# Patient Record
Sex: FEMALE | Race: BLACK OR AFRICAN AMERICAN | NOT HISPANIC OR LATINO
[De-identification: names, ages, dates, MRNs, and addresses within clinical notes are randomized per-mention and may not be internally consistent; named-entity substitution may affect disease eponyms.]

---

## 2020-06-29 PROBLEM — Z00.00 ENCOUNTER FOR PREVENTIVE HEALTH EXAMINATION: Status: ACTIVE | Noted: 2020-06-29

## 2020-07-08 ENCOUNTER — LABORATORY RESULT (OUTPATIENT)
Age: 75
End: 2020-07-08

## 2020-07-08 ENCOUNTER — APPOINTMENT (OUTPATIENT)
Dept: NEPHROLOGY | Facility: CLINIC | Age: 75
End: 2020-07-08
Payer: MEDICARE

## 2020-07-08 VITALS
SYSTOLIC BLOOD PRESSURE: 144 MMHG | BODY MASS INDEX: 29.62 KG/M2 | WEIGHT: 200 LBS | DIASTOLIC BLOOD PRESSURE: 85 MMHG | HEART RATE: 72 BPM | HEIGHT: 69 IN

## 2020-07-08 VITALS — SYSTOLIC BLOOD PRESSURE: 132 MMHG | DIASTOLIC BLOOD PRESSURE: 80 MMHG

## 2020-07-08 PROCEDURE — 99204 OFFICE O/P NEW MOD 45 MIN: CPT

## 2020-07-08 RX ORDER — ATORVASTATIN CALCIUM 40 MG/1
40 TABLET, FILM COATED ORAL
Refills: 0 | Status: ACTIVE | COMMUNITY

## 2020-07-08 NOTE — HISTORY OF PRESENT ILLNESS
[FreeTextEntry1] : 75-year-old woman with a 15 to 20-year history of hypertension and type 2 diabetes, who also has CAD, anemia of CKD, gout, hyperlipidemia, asthma, and ill-defined swallowing difficulty.  She appears to have diabetic neuropathy affecting the hands, but not the feet, and no obvious retinopathy.  Her BP has not been well controlled by history, often running 1 50-1 70 systolic.  She is currently on losartan 50 mg daily, carvedilol 12.5 mg twice daily, and furosemide 40 mg, but not daily.  1 month ago, her creatinine was 1.99 with a BUN of 31 and a GFR of 28, hemoglobin 9.5, CO2 25 -but no potassium was reported because of hemolysis.  She is referred by her PCP, Dr. Fausto Mills.  She is a former smoker of 2 packs/day who quit 15 years ago.  She has been unable to ambulate and uses a walker for the last 7 years.  She does not take NSAIDs or PPIs.  She had COVID-19 2 to 3 months ago, tested positive, but recuperated at home.

## 2020-07-08 NOTE — CONSULT LETTER
[Dear  ___] : Dear  [unfilled], [Please see my note below.] : Please see my note below. [Consult Letter:] : I had the pleasure of evaluating your patient, [unfilled]. [Sincerely,] : Sincerely, [Consult Closing:] : Thank you very much for allowing me to participate in the care of this patient.  If you have any questions, please do not hesitate to contact me. [FreeTextEntry2] : Dr Fausto Mills [FreeTextEntry3] : Sincerely, \par \par Rajan Quigley MD, FACP

## 2020-07-08 NOTE — REVIEW OF SYSTEMS
[Feeling Tired] : feeling tired [Eyes Itch] : itching of the eyes [SOB on Exertion] : shortness of breath during exertion [Wheezing] : wheezing [Negative] : Endocrine [de-identified] : pruritus - face

## 2020-07-08 NOTE — PHYSICAL EXAM
[General Appearance - Alert] : alert [General Appearance - In No Acute Distress] : in no acute distress [Sclera] : the sclera and conjunctiva were normal [PERRL With Normal Accommodation] : pupils were equal in size, round, and reactive to light [Outer Ear] : the ears and nose were normal in appearance [Neck Cervical Mass (___cm)] : no neck mass was observed [Neck Appearance] : the appearance of the neck was normal [Jugular Venous Distention Increased] : there was no jugular-venous distention [Auscultation Breath Sounds / Voice Sounds] : lungs were clear to auscultation bilaterally [Heart Rate And Rhythm] : heart rate was normal and rhythm regular [Heart Sounds] : normal S1 and S2 [Heart Sounds Gallop] : no gallops [Murmurs] : no murmurs [Heart Sounds Pericardial Friction Rub] : no pericardial rub [Edema] : there was no peripheral edema [Bowel Sounds] : normal bowel sounds [Abdomen Soft] : soft [Abdomen Tenderness] : non-tender [Cervical Lymph Nodes Enlarged Posterior Bilaterally] : posterior cervical [Abdomen Mass (___ Cm)] : no abdominal mass palpated [Cervical Lymph Nodes Enlarged Anterior Bilaterally] : anterior cervical [Supraclavicular Lymph Nodes Enlarged Bilaterally] : supraclavicular [No CVA Tenderness] : no ~M costovertebral angle tenderness [No Spinal Tenderness] : no spinal tenderness [Nail Clubbing] : no clubbing  or cyanosis of the fingernails [Musculoskeletal - Swelling] : no joint swelling seen [Motor Tone] : muscle strength and tone were normal [Skin Color & Pigmentation] : normal skin color and pigmentation [Skin Turgor] : normal skin turgor [Deep Tendon Reflexes (DTR)] : deep tendon reflexes were 2+ and symmetric [] : no rash [No Focal Deficits] : no focal deficits

## 2020-07-08 NOTE — ASSESSMENT
[FreeTextEntry1] : This 75-year-old woman referred by Dr Mills, has CKD 4, probably due to a combination of diabetic nephropathy and hypertensive nephrosclerosis -but we will attempt to rule out any other causes.  Her BP today was surprisingly good but will need to be watched closely.. Her daughter states that her potassium has been high and we will repeat that since the June labs did not include a potassium due to hemolysis according to the lab.  She does not have overt uremic symptoms.  We will order renal ultrasound to assess size, echogenicity, and rule out obstructive uropathy.  We will repeat BMP, CBC, and obtain iron/TIBC, ferritin, JONATHAN, hepatitis B surface antigen, hepatitis C antibody, phosphorus, PTH, urine microalbumin, uric acid.  If all is well, we will see her again in 4 months and she will follow-up regularly with Dr. Mills.  We discussed, the patient, her daughter, and I, the need for good BP control and diabetes control in order to slow the progression of CKD.  She did not exhibit metabolic acidosis on the June labs, which is good, since that tends to accelerate the progression of CKD.  She was reminded to avoid all NSAIDs, and we should avoid PPIs whenever possible.

## 2020-07-09 LAB
25(OH)D3 SERPL-MCNC: 18.9 NG/ML
ANION GAP SERPL CALC-SCNC: 13 MMOL/L
BASOPHILS # BLD AUTO: 0.05 K/UL
BASOPHILS NFR BLD AUTO: 0.9 %
BUN SERPL-MCNC: 30 MG/DL
CALCIUM SERPL-MCNC: 9.9 MG/DL
CALCIUM SERPL-MCNC: 9.9 MG/DL
CHLORIDE SERPL-SCNC: 109 MMOL/L
CO2 SERPL-SCNC: 22 MMOL/L
CREAT SERPL-MCNC: 1.84 MG/DL
DEPRECATED KAPPA LC FREE/LAMBDA SER: 2.43 RATIO
EOSINOPHIL # BLD AUTO: 0.34 K/UL
EOSINOPHIL NFR BLD AUTO: 5.8 %
EPO SERPL-MCNC: 12.6 MIU/ML
ESTIMATED AVERAGE GLUCOSE: 128 MG/DL
FERRITIN SERPL-MCNC: 145 NG/ML
GLUCOSE SERPL-MCNC: 113 MG/DL
HBA1C MFR BLD HPLC: 6.1 %
HBV SURFACE AG SER QL: NONREACTIVE
HCT VFR BLD CALC: 35.7 %
HCV AB SER QL: NONREACTIVE
HCV S/CO RATIO: 0.33 S/CO
HGB BLD-MCNC: 10.5 G/DL
IMM GRANULOCYTES NFR BLD AUTO: 0.2 %
IRON SATN MFR SERPL: 23 %
IRON SERPL-MCNC: 55 UG/DL
KAPPA LC CSF-MCNC: 4.22 MG/DL
KAPPA LC SERPL-MCNC: 10.27 MG/DL
LYMPHOCYTES # BLD AUTO: 1.06 K/UL
LYMPHOCYTES NFR BLD AUTO: 18.2 %
MAN DIFF?: NORMAL
MCHC RBC-ENTMCNC: 29.4 GM/DL
MCHC RBC-ENTMCNC: 30.3 PG
MCV RBC AUTO: 102.9 FL
MONOCYTES # BLD AUTO: 0.38 K/UL
MONOCYTES NFR BLD AUTO: 6.5 %
NEUTROPHILS # BLD AUTO: 3.98 K/UL
NEUTROPHILS NFR BLD AUTO: 68.4 %
PARATHYROID HORMONE INTACT: 215 PG/ML
PHOSPHATE SERPL-MCNC: 3.7 MG/DL
PLATELET # BLD AUTO: 247 K/UL
POTASSIUM SERPL-SCNC: 5.4 MMOL/L
RBC # BLD: 3.47 M/UL
RBC # FLD: 14 %
SODIUM SERPL-SCNC: 143 MMOL/L
TIBC SERPL-MCNC: 241 UG/DL
UIBC SERPL-MCNC: 186 UG/DL
WBC # FLD AUTO: 5.82 K/UL

## 2020-07-14 ENCOUNTER — OUTPATIENT (OUTPATIENT)
Dept: OUTPATIENT SERVICES | Facility: HOSPITAL | Age: 75
LOS: 1 days | End: 2020-07-14
Payer: MEDICARE

## 2020-07-14 ENCOUNTER — APPOINTMENT (OUTPATIENT)
Dept: ULTRASOUND IMAGING | Facility: HOSPITAL | Age: 75
End: 2020-07-14
Payer: MEDICARE

## 2020-07-14 PROCEDURE — 76770 US EXAM ABDO BACK WALL COMP: CPT

## 2020-07-14 PROCEDURE — 76770 US EXAM ABDO BACK WALL COMP: CPT | Mod: 26

## 2020-07-20 LAB
ANA PAT FLD IF-IMP: ABNORMAL
ANA SER IF-ACNC: ABNORMAL

## 2020-10-05 ENCOUNTER — APPOINTMENT (OUTPATIENT)
Dept: HEMATOLOGY ONCOLOGY | Facility: CLINIC | Age: 75
End: 2020-10-05
Payer: MEDICARE

## 2020-10-05 VITALS
HEIGHT: 69 IN | WEIGHT: 205 LBS | DIASTOLIC BLOOD PRESSURE: 76 MMHG | OXYGEN SATURATION: 98 % | TEMPERATURE: 97.6 F | HEART RATE: 94 BPM | BODY MASS INDEX: 30.36 KG/M2 | SYSTOLIC BLOOD PRESSURE: 172 MMHG

## 2020-10-05 VITALS — DIASTOLIC BLOOD PRESSURE: 90 MMHG | SYSTOLIC BLOOD PRESSURE: 184 MMHG

## 2020-10-05 PROCEDURE — 36415 COLL VENOUS BLD VENIPUNCTURE: CPT

## 2020-10-05 PROCEDURE — 99204 OFFICE O/P NEW MOD 45 MIN: CPT | Mod: 25

## 2020-10-05 RX ORDER — ASPIRIN ENTERIC COATED TABLETS 81 MG 81 MG/1
81 TABLET, DELAYED RELEASE ORAL
Refills: 0 | Status: ACTIVE | COMMUNITY

## 2020-10-06 RX ORDER — CLOPIDOGREL BISULFATE 75 MG/1
75 TABLET, FILM COATED ORAL
Qty: 90 | Refills: 0 | Status: ACTIVE | COMMUNITY
Start: 2020-09-02

## 2020-10-27 NOTE — ASSESSMENT
[FreeTextEntry1] : Normochromic normocytic anemia in patient with CKD... Repeat immunoelectrophoresis rule out myeloma...\par \par Discussed with patient and her daughter vitamin D deficiency, she was given written instruction re vitamin D replacement...\par \par Patient was found to have an IgG of 1700, with a very high beta-2 microglobulin disproportionate to 2 degree of her anemia, I therefore recommend bone marrow aspirate and biopsy to be done by interventional radiology.

## 2020-10-27 NOTE — CONSULT LETTER
[Dear  ___] : Dear  [unfilled], [Consult Letter:] : I had the pleasure of evaluating your patient, [unfilled]. [Please see my note below.] : Please see my note below. [Consult Closing:] : Thank you very much for allowing me to participate in the care of this patient.  If you have any questions, please do not hesitate to contact me. [Sincerely,] : Sincerely, [FreeTextEntry3] : Sendy Boyer MD\par  [DrDesire  ___] : Dr. DOMINGUEZ

## 2020-10-27 NOTE — HISTORY OF PRESENT ILLNESS
[de-identified] : 75 years old female history of longstanding diabetes mellitus was found to have chronic renal failure with anemia and questionable monoclonal paraprotein... Patient is here with her daughter for hematology consultation..

## 2020-10-29 ENCOUNTER — APPOINTMENT (OUTPATIENT)
Dept: HEMATOLOGY ONCOLOGY | Facility: CLINIC | Age: 75
End: 2020-10-29

## 2020-11-09 ENCOUNTER — LABORATORY RESULT (OUTPATIENT)
Age: 75
End: 2020-11-09

## 2020-11-12 ENCOUNTER — RESULT REVIEW (OUTPATIENT)
Age: 75
End: 2020-11-12

## 2020-11-12 ENCOUNTER — OUTPATIENT (OUTPATIENT)
Dept: OUTPATIENT SERVICES | Facility: HOSPITAL | Age: 75
LOS: 1 days | End: 2020-11-12
Payer: MEDICARE

## 2020-11-12 ENCOUNTER — APPOINTMENT (OUTPATIENT)
Dept: INTERVENTIONAL RADIOLOGY/VASCULAR | Facility: HOSPITAL | Age: 75
End: 2020-11-12
Payer: MEDICARE

## 2020-11-12 LAB — GLUCOSE BLDC GLUCOMTR-MCNC: 95 MG/DL — SIGNIFICANT CHANGE UP (ref 70–99)

## 2020-11-12 PROCEDURE — 99152 MOD SED SAME PHYS/QHP 5/>YRS: CPT

## 2020-11-12 PROCEDURE — 77002 NEEDLE LOCALIZATION BY XRAY: CPT | Mod: 26

## 2020-11-12 PROCEDURE — 88305 TISSUE EXAM BY PATHOLOGIST: CPT | Mod: 26

## 2020-11-12 PROCEDURE — 88313 SPECIAL STAINS GROUP 2: CPT | Mod: 26

## 2020-11-12 PROCEDURE — 88365 INSITU HYBRIDIZATION (FISH): CPT | Mod: 26

## 2020-11-12 PROCEDURE — 88189 FLOWCYTOMETRY/READ 16 & >: CPT | Mod: 59

## 2020-11-12 PROCEDURE — 85097 BONE MARROW INTERPRETATION: CPT

## 2020-11-12 PROCEDURE — 88364 INSITU HYBRIDIZATION (FISH): CPT | Mod: 26

## 2020-11-12 PROCEDURE — 88342 IMHCHEM/IMCYTCHM 1ST ANTB: CPT | Mod: 26,59

## 2020-11-12 PROCEDURE — 38222 DX BONE MARROW BX & ASPIR: CPT | Mod: LT

## 2020-11-12 PROCEDURE — 88341 IMHCHEM/IMCYTCHM EA ADD ANTB: CPT | Mod: 26

## 2020-11-12 PROCEDURE — 88311 DECALCIFY TISSUE: CPT | Mod: 26

## 2020-11-13 PROCEDURE — 82962 GLUCOSE BLOOD TEST: CPT

## 2020-11-13 PROCEDURE — 88365 INSITU HYBRIDIZATION (FISH): CPT

## 2020-11-13 PROCEDURE — 88311 DECALCIFY TISSUE: CPT

## 2020-11-13 PROCEDURE — 88313 SPECIAL STAINS GROUP 2: CPT

## 2020-11-13 PROCEDURE — 77002 NEEDLE LOCALIZATION BY XRAY: CPT

## 2020-11-13 PROCEDURE — 85097 BONE MARROW INTERPRETATION: CPT

## 2020-11-13 PROCEDURE — 88341 IMHCHEM/IMCYTCHM EA ADD ANTB: CPT

## 2020-11-13 PROCEDURE — 88364 INSITU HYBRIDIZATION (FISH): CPT

## 2020-11-13 PROCEDURE — 38222 DX BONE MARROW BX & ASPIR: CPT

## 2020-11-13 PROCEDURE — 88305 TISSUE EXAM BY PATHOLOGIST: CPT

## 2020-11-13 PROCEDURE — 99152 MOD SED SAME PHYS/QHP 5/>YRS: CPT

## 2020-11-17 LAB — HEMATOPATHOLOGY REPORT: SIGNIFICANT CHANGE UP

## 2020-11-30 ENCOUNTER — APPOINTMENT (OUTPATIENT)
Dept: HEMATOLOGY ONCOLOGY | Facility: CLINIC | Age: 75
End: 2020-11-30
Payer: MEDICARE

## 2020-11-30 VITALS
HEIGHT: 69 IN | TEMPERATURE: 97.3 F | HEART RATE: 92 BPM | SYSTOLIC BLOOD PRESSURE: 177 MMHG | OXYGEN SATURATION: 95 % | WEIGHT: 205 LBS | DIASTOLIC BLOOD PRESSURE: 101 MMHG | BODY MASS INDEX: 30.36 KG/M2

## 2020-11-30 LAB
ALBUMIN MFR SERPL ELPH: 51.8 %
ALBUMIN SERPL ELPH-MCNC: 4.1 G/DL
ALBUMIN SERPL-MCNC: 3.8 G/DL
ALBUMIN/GLOB SERPL: 1.1 RATIO
ALP BLD-CCNC: 76 U/L
ALPHA1 GLOB MFR SERPL ELPH: 4.4 %
ALPHA1 GLOB SERPL ELPH-MCNC: 0.3 G/DL
ALPHA2 GLOB MFR SERPL ELPH: 10.6 %
ALPHA2 GLOB SERPL ELPH-MCNC: 0.8 G/DL
ALT SERPL-CCNC: 7 U/L
ANION GAP SERPL CALC-SCNC: 14 MMOL/L
APTT BLD: 29.6 SEC
AST SERPL-CCNC: 19 U/L
B-GLOBULIN MFR SERPL ELPH: 12 %
B-GLOBULIN SERPL ELPH-MCNC: 0.9 G/DL
B2 MICROGLOB SERPL-MCNC: >8 MG/L
BASOPHILS # BLD AUTO: 0.04 K/UL
BASOPHILS NFR BLD AUTO: 0.7 %
BILIRUB SERPL-MCNC: 0.6 MG/DL
BUN SERPL-MCNC: 29 MG/DL
CALCIUM SERPL-MCNC: 8.9 MG/DL
CHLORIDE SERPL-SCNC: 105 MMOL/L
CO2 SERPL-SCNC: 25 MMOL/L
CREAT SERPL-MCNC: 2.02 MG/DL
DEPRECATED KAPPA LC FREE/LAMBDA SER: 2.08 RATIO
EOSINOPHIL # BLD AUTO: 0.31 K/UL
EOSINOPHIL NFR BLD AUTO: 5.4 %
ERYTHROCYTE [SEDIMENTATION RATE] IN BLOOD BY WESTERGREN METHOD: 33 MM/HR
GAMMA GLOB FLD ELPH-MCNC: 1.5 G/DL
GAMMA GLOB MFR SERPL ELPH: 21.2 %
GLUCOSE SERPL-MCNC: 147 MG/DL
HCT VFR BLD CALC: 33.3 %
HGB BLD-MCNC: 9.9 G/DL
IGA SER QL IEP: 382 MG/DL
IGG SER QL IEP: 1707 MG/DL
IGM SER QL IEP: 115 MG/DL
IMM GRANULOCYTES NFR BLD AUTO: 0.2 %
INR PPP: 0.98 RATIO
INTERPRETATION SERPL IEP-IMP: NORMAL
KAPPA LC CSF-MCNC: 6.17 MG/DL
KAPPA LC SERPL-MCNC: 12.81 MG/DL
LYMPHOCYTES # BLD AUTO: 1.08 K/UL
LYMPHOCYTES NFR BLD AUTO: 19 %
M PROTEIN MFR SERPL ELPH: 2.2 %
M PROTEIN SPEC IFE-MCNC: NORMAL
MAN DIFF?: NORMAL
MCHC RBC-ENTMCNC: 29.7 GM/DL
MCHC RBC-ENTMCNC: 30.9 PG
MCV RBC AUTO: 104.1 FL
MONOCLON BAND OBS SERPL: 0.2 G/DL
MONOCYTES # BLD AUTO: 0.38 K/UL
MONOCYTES NFR BLD AUTO: 6.7 %
NEUTROPHILS # BLD AUTO: 3.87 K/UL
NEUTROPHILS NFR BLD AUTO: 68 %
PLATELET # BLD AUTO: 242 K/UL
POTASSIUM SERPL-SCNC: 5.2 MMOL/L
PROT SERPL-MCNC: 7.3 G/DL
PT BLD: 11.7 SEC
RBC # BLD: 3.2 M/UL
RBC # FLD: 13.4 %
SODIUM SERPL-SCNC: 143 MMOL/L
VIT B12 SERPL-MCNC: 623 PG/ML
WBC # FLD AUTO: 5.69 K/UL

## 2020-11-30 PROCEDURE — 36415 COLL VENOUS BLD VENIPUNCTURE: CPT

## 2020-11-30 PROCEDURE — 99215 OFFICE O/P EST HI 40 MIN: CPT | Mod: 25

## 2020-11-30 RX ORDER — BLOOD-GLUCOSE METER
W/DEVICE KIT MISCELLANEOUS
Qty: 1 | Refills: 0 | Status: ACTIVE | COMMUNITY
Start: 2020-10-29

## 2020-11-30 RX ORDER — BLOOD SUGAR DIAGNOSTIC
STRIP MISCELLANEOUS
Qty: 100 | Refills: 0 | Status: ACTIVE | COMMUNITY
Start: 2020-10-17

## 2020-11-30 RX ORDER — ISOPROPYL ALCOHOL 0.75 G/1
SWAB TOPICAL
Qty: 100 | Refills: 0 | Status: ACTIVE | COMMUNITY
Start: 2020-10-29

## 2020-11-30 RX ORDER — PEN NEEDLE, DIABETIC 32GX 5/32"
NEEDLE, DISPOSABLE MISCELLANEOUS
Qty: 100 | Refills: 0 | Status: ACTIVE | COMMUNITY
Start: 2020-10-17

## 2020-11-30 NOTE — HISTORY OF PRESENT ILLNESS
[de-identified] : 75 years old female history of longstanding diabetes mellitus was found to have chronic renal failure with anemia and questionable monoclonal paraprotein... Patient is here with her daughter for hematology consultation.. [de-identified] : November 30, 2020 patient returns for follow-up... Her bone marrow aspirate and biopsy no myeloma!\par Results discussed in detail with patient and her daughter.... Patient wanted to know what she can do to improve her kidney function... Turns out she eats a lot of sugar in her diet...

## 2020-11-30 NOTE — ASSESSMENT
[FreeTextEntry1] : Normochromic normocytic anemia in patient with CKD... No myeloma on bone marrow...\par \par Jp blood work done to follow-up on her MGUS.\par \par I communicated with Dr. Quigley the need to follow patient for her chronic renal failure.

## 2020-12-08 ENCOUNTER — APPOINTMENT (OUTPATIENT)
Dept: ENDOCRINOLOGY | Facility: CLINIC | Age: 75
End: 2020-12-08
Payer: MEDICARE

## 2020-12-08 VITALS
HEIGHT: 69 IN | SYSTOLIC BLOOD PRESSURE: 158 MMHG | TEMPERATURE: 97.3 F | HEART RATE: 93 BPM | WEIGHT: 205 LBS | BODY MASS INDEX: 30.36 KG/M2 | OXYGEN SATURATION: 98 % | DIASTOLIC BLOOD PRESSURE: 80 MMHG

## 2020-12-08 DIAGNOSIS — Z13.820 ENCOUNTER FOR SCREENING FOR OSTEOPOROSIS: ICD-10-CM

## 2020-12-08 PROCEDURE — 99204 OFFICE O/P NEW MOD 45 MIN: CPT

## 2020-12-08 PROCEDURE — 99072 ADDL SUPL MATRL&STAF TM PHE: CPT

## 2020-12-08 NOTE — PHYSICAL EXAM
[Alert] : alert [Well Nourished] : well nourished [No Acute Distress] : no acute distress [EOMI] : extra ocular movement intact [Normal Hearing] : hearing was normal [Thyroid Not Enlarged] : the thyroid was not enlarged [No Respiratory Distress] : no respiratory distress [No Accessory Muscle Use] : no accessory muscle use [Clear to Auscultation] : lungs were clear to auscultation bilaterally [Normal to Percussion] : lungs were normal to percussion [Normal S1, S2] : normal S1 and S2 [Normal Rate] : heart rate was normal [No Stigmata of Cushings Syndrome] : no stigmata of Cushings Syndrome [Foot Ulcers] : no foot ulcers [Right Foot Was Examined] : right foot ~C was examined [Left Foot Was Examined] : left foot ~C was examined [Normal] : normal [Swelling] : not swollen [Erythema] : not erythematous [Full ROM] : with full range of motion [Tenderness] : not tender [2+] : 2+ in the dorsalis pedis [#1 Diminished] : number 1 was normal [#2 Diminished] : number 2 was normal [#3 Diminished] : number 3 was normal [#4 Diminished] : number 4 was normal [#5 Diminished] : number 5 was normal [#6 Diminished] : number 6 was normal [#7 Diminished] : number 7 was normal [#8 Diminished] : number 8 was normal [#9 Diminished] : number 9 was normal [#10 Diminished] : number 10 was normal [Normal Affect] : the affect was normal [Normal Insight/Judgement] : insight and judgment were intact [Normal Mood] : the mood was normal [de-identified] : ambulates with wheeled walker

## 2020-12-08 NOTE — CONSULT LETTER
[Dear  ___] : Dear  [unfilled], [Consult Letter:] : I had the pleasure of evaluating your patient, [unfilled]. [Please see my note below.] : Please see my note below. [Consult Closing:] : Thank you very much for allowing me to participate in the care of this patient.  If you have any questions, please do not hesitate to contact me. [Sincerely,] : Sincerely, [FreeTextEntry3] : Marni Flores MD

## 2020-12-08 NOTE — ASSESSMENT
[Diabetes Foot Care] : diabetes foot care [Long Term Vascular Complications] : long term vascular complications of diabetes [Carbohydrate Consistent Diet] : carbohydrate consistent diet [Importance of Diet and Exercise] : importance of diet and exercise to improve glycemic control, achieve weight loss and improve cardiovascular health [Hypoglycemia Management] : hypoglycemia management [Self Monitoring of Blood Glucose] : self monitoring of blood glucose [Retinopathy Screening] : Patient was referred to ophthalmology for retinopathy screening [FreeTextEntry1] : Patient is a 76 yo woman with prediabetes, HTN, CKD establishing care for prediabetes\par \par 1. Prediabetes\par -patient with reported history of Type 2 diabetes years ago that required insulin. Has not been on insulin in at least 10 years and was not on any medications until recently when metformin 500 mg once a day was started\par -patient is having some loose stools with metformin but able to tolerate\par -Serum A1c was 6.4% which indicates prediabetes\par -educated about the importance of eating healthy not just from the diabetes perspective but also from renal viewpoint. Diet is high in soda, juice, high sodium fast foods\par -at this time, medications that are essential would be ideal to prevent medication cross reactions\par -trial off of metformin acceptable at this time.  Patient to check fingersticks and if values are in the 100-140's can maintain off metformin\par -i will check a fructosamine level today in the setting of her anemia\par -patient require dilated eye exam\par -foot exam normal today\par -renal follow up as scheduled\par -check lipid profile today\par \par 2. HTN\par -BP goal < 140/90\par -managed by nephrology\par \par 3. Osteoporois\par -requires DXA screening\par -referral given today\par -fall precautions\par -check TSH\par \par Follow up in 4 months, call with hypo/hyperglycemic excursions.  Daughter Annie was present for the visit with patient permission/

## 2020-12-08 NOTE — REASON FOR VISIT
[Initial Evaluation] : an initial evaluation [DM Type 2] : DM Type 2 [Family Member] : family member [Source: ______] : History obtained from [facility] [FreeTextEntry2] : Dr. Sendy Boyer

## 2020-12-08 NOTE — HISTORY OF PRESENT ILLNESS
[FreeTextEntry1] : Patient is a 74 yo woman with reported history of type 2 diabetes, CKD, HTN and HLD referred for diabetes. \par \par Diagnosed with diabetes at [facility] in the early 2000s. She used to go to the clinic.  Was on insulin and was then taken off.  Over the last 10 years, has not been on insulin.\par Breakfast: vaca, ham, eggs, oatmeal cereal\par Snacks: lunch meat or cracker\par Lunch: rice and meat\par Dinner: Eats fast food and Chinese food\par Drinks soda about 4 times a day and juice as well.  "Needs like I need soda"\par Likes cookies, candy and cake\par Was started on metformin 500 mg daily one month ago.\par Last dilated eye exam: years\par No amputation history, did have heart valve complications.\par Checked sugars for about a week and values were in the 100's, highest value once is 180\par Patient states that since starting the metformin, she is having some loose stools\par November 30, 2020\par A1c 6.4%

## 2020-12-08 NOTE — QUALITY MEASURES
[Visual inspection, sensory exam] : Foot exam, including visual inspection, sensory exam with mono filament, and pulse exam, was performed within the last 12 months [Nephrology Follow-Up] : patient is currently receiving treatment via nephrology follow-up

## 2020-12-08 NOTE — REVIEW OF SYSTEMS
[Fatigue] : no fatigue [Decreased Appetite] : appetite not decreased [As Noted in HPI] : as noted in HPI [Dysphagia] : dysphagia [Neck Pain] : no neck pain [Nasal Congestion] : no nasal congestion [Chest Pain] : no chest pain [Palpitations] : no palpitations [Shortness Of Breath] : no shortness of breath [Nausea] : no nausea [Vomiting] : no vomiting [Headaches] : no headaches [Depression] : no depression [Cold Intolerance] : no cold intolerance [Heat Intolerance] : no heat intolerance

## 2020-12-09 LAB
25(OH)D3 SERPL-MCNC: 36.1 NG/ML
CHOLEST SERPL-MCNC: 142 MG/DL
FRUCTOSAMINE SERPL-MCNC: 327 UMOL/L
HDLC SERPL-MCNC: 60 MG/DL
LDLC SERPL CALC-MCNC: 58 MG/DL
NONHDLC SERPL-MCNC: 82 MG/DL
T3 SERPL-MCNC: 97 NG/DL
T4 FREE SERPL-MCNC: 1.5 NG/DL
T4 FREE SERPL-MCNC: 1.5 NG/DL
THYROGLOB AB SERPL-ACNC: 21.3 IU/ML
THYROPEROXIDASE AB SERPL IA-ACNC: 49.9 IU/ML
TRIGL SERPL-MCNC: 120 MG/DL
TSH SERPL-ACNC: 1.25 UIU/ML
TSH SERPL-ACNC: 1.32 UIU/ML

## 2020-12-10 ENCOUNTER — APPOINTMENT (OUTPATIENT)
Dept: NEPHROLOGY | Facility: CLINIC | Age: 75
End: 2020-12-10
Payer: MEDICARE

## 2020-12-10 VITALS
WEIGHT: 205 LBS | SYSTOLIC BLOOD PRESSURE: 126 MMHG | DIASTOLIC BLOOD PRESSURE: 68 MMHG | BODY MASS INDEX: 30.36 KG/M2 | HEART RATE: 84 BPM | HEIGHT: 69 IN

## 2020-12-10 DIAGNOSIS — E55.9 VITAMIN D DEFICIENCY, UNSPECIFIED: ICD-10-CM

## 2020-12-10 PROCEDURE — 99214 OFFICE O/P EST MOD 30 MIN: CPT

## 2020-12-10 PROCEDURE — 99072 ADDL SUPL MATRL&STAF TM PHE: CPT

## 2020-12-10 NOTE — ASSESSMENT
[FreeTextEntry1] : 75-year-old woman with hypertension that is now well controlled, CKD 4 which may have worsened from July to now, although she has no new symptoms.  Her mild pruritus will be managed with skin moisturizers.  She will remain on carvedilol 12.5 mg twice daily, losartan 50 mg daily, and furosemide 40 mg daily, as well as the newly added amlodipine 5 mg daily.  I have put in a requisition for labs to be repeated before her next visit, including CBC, BMP, phosphorus, and PTH.  I am hoping that her creatinine will be closer to her previous 2.0 range then to the latest 2.4.  If her K rises any further, she may need a potassium binder.  Sodium bicarbonate is not appropriate because she is not acidotic.  She will follow-up with Luanne Mills & Sarah Beth Connolly.  She will return within 3 months.

## 2020-12-10 NOTE — PHYSICAL EXAM
[General Appearance - Alert] : alert [General Appearance - In No Acute Distress] : in no acute distress [Sclera] : the sclera and conjunctiva were normal [PERRL With Normal Accommodation] : pupils were equal in size, round, and reactive to light [Outer Ear] : the ears and nose were normal in appearance [Neck Appearance] : the appearance of the neck was normal [Neck Cervical Mass (___cm)] : no neck mass was observed [Jugular Venous Distention Increased] : there was no jugular-venous distention [Auscultation Breath Sounds / Voice Sounds] : lungs were clear to auscultation bilaterally [Heart Rate And Rhythm] : heart rate was normal and rhythm regular [Heart Sounds] : normal S1 and S2 [Heart Sounds Gallop] : no gallops [Murmurs] : no murmurs [Heart Sounds Pericardial Friction Rub] : no pericardial rub [Edema] : there was no peripheral edema [Bowel Sounds] : normal bowel sounds [Abdomen Soft] : soft [Abdomen Tenderness] : non-tender [Abdomen Mass (___ Cm)] : no abdominal mass palpated [Cervical Lymph Nodes Enlarged Posterior Bilaterally] : posterior cervical [Cervical Lymph Nodes Enlarged Anterior Bilaterally] : anterior cervical [Supraclavicular Lymph Nodes Enlarged Bilaterally] : supraclavicular [No CVA Tenderness] : no ~M costovertebral angle tenderness [No Spinal Tenderness] : no spinal tenderness [Nail Clubbing] : no clubbing  or cyanosis of the fingernails [Musculoskeletal - Swelling] : no joint swelling seen [Motor Tone] : muscle strength and tone were normal [Skin Color & Pigmentation] : normal skin color and pigmentation [Skin Turgor] : normal skin turgor [] : no rash [Deep Tendon Reflexes (DTR)] : deep tendon reflexes were 2+ and symmetric [No Focal Deficits] : no focal deficits

## 2020-12-10 NOTE — HISTORY OF PRESENT ILLNESS
[FreeTextEntry1] : 75-year-old woman with a long history of hypertension and diabetes, CKD 4, CAD, anemia of CKD, gout, hyperlipidemia -her creatinine has risen from 2.0 up to 2.4, without any increase in diuretics, or volume depletion such as nausea vomiting or diarrhea.  She feels relatively well with stable appetite, decent blood sugar control in the 1 20-1 80 range, mild facial pruritus, no increase in [place].  Her BP was very high 10 days ago when she saw Dr. Boyer, but that has responded beautifully to the addition of amlodipine 5 mg daily.  She had a bout of COVID-19 about 7 to 8 months ago but recuperated at home.  Her K is 5.4 but with no acidosis and her GFR is 22.  She remains well-nourished with an albumin of 4.3.

## 2020-12-10 NOTE — CONSULT LETTER
[Dear  ___] : Dear  [unfilled], [Consult Letter:] : I had the pleasure of evaluating your patient, [unfilled]. [Please see my note below.] : Please see my note below. [Consult Closing:] : Thank you very much for allowing me to participate in the care of this patient.  If you have any questions, please do not hesitate to contact me. [Sincerely,] : Sincerely, [DrDesire  ___] : Dr. DOMINGUEZ [FreeTextEntry2] : Dr Fausto Mills [FreeTextEntry3] : Sincerely, \par \par Rajan Quigley MD, FACP

## 2021-01-07 LAB
25(OH)D3 SERPL-MCNC: 40.4 NG/ML
ALBUMIN MFR SERPL ELPH: 52.2 %
ALBUMIN SERPL ELPH-MCNC: 4.3 G/DL
ALBUMIN SERPL-MCNC: 4.2 G/DL
ALBUMIN/GLOB SERPL: 1.1 RATIO
ALP BLD-CCNC: 79 U/L
ALPHA1 GLOB MFR SERPL ELPH: 4 %
ALPHA1 GLOB SERPL ELPH-MCNC: 0.3 G/DL
ALPHA2 GLOB MFR SERPL ELPH: 9.7 %
ALPHA2 GLOB SERPL ELPH-MCNC: 0.8 G/DL
ALT SERPL-CCNC: 8 U/L
ANION GAP SERPL CALC-SCNC: 12 MMOL/L
AST SERPL-CCNC: 16 U/L
B-GLOBULIN MFR SERPL ELPH: 11.8 %
B-GLOBULIN SERPL ELPH-MCNC: 0.9 G/DL
B2 MICROGLOB SERPL-MCNC: 11 MG/L
BASOPHILS # BLD AUTO: 0.05 K/UL
BASOPHILS NFR BLD AUTO: 0.8 %
BILIRUB SERPL-MCNC: 0.6 MG/DL
BUN SERPL-MCNC: 40 MG/DL
CALCIUM SERPL-MCNC: 9.6 MG/DL
CHLORIDE SERPL-SCNC: 103 MMOL/L
CO2 SERPL-SCNC: 29 MMOL/L
CREAT SERPL-MCNC: 2.4 MG/DL
DEPRECATED KAPPA LC FREE/LAMBDA SER: 2.15 RATIO
EOSINOPHIL # BLD AUTO: 0.3 K/UL
EOSINOPHIL NFR BLD AUTO: 4.5 %
ERYTHROCYTE [SEDIMENTATION RATE] IN BLOOD BY WESTERGREN METHOD: 36 MM/HR
ESTIMATED AVERAGE GLUCOSE: 137 MG/DL
GAMMA GLOB FLD ELPH-MCNC: 1.8 G/DL
GAMMA GLOB MFR SERPL ELPH: 22.3 %
GLUCOSE SERPL-MCNC: 117 MG/DL
HBA1C MFR BLD HPLC: 6.4 %
HCT VFR BLD CALC: 36.9 %
HGB BLD-MCNC: 11 G/DL
IGA SER QL IEP: 425 MG/DL
IGG SER QL IEP: 1964 MG/DL
IGM SER QL IEP: 125 MG/DL
IMM GRANULOCYTES NFR BLD AUTO: 0.2 %
INTERPRETATION SERPL IEP-IMP: NORMAL
KAPPA LC CSF-MCNC: 5.92 MG/DL
KAPPA LC SERPL-MCNC: 12.73 MG/DL
LYMPHOCYTES # BLD AUTO: 1.31 K/UL
LYMPHOCYTES NFR BLD AUTO: 19.7 %
M PROTEIN MFR SERPL ELPH: 2.3 %
M PROTEIN SPEC IFE-MCNC: NORMAL
MAN DIFF?: NORMAL
MCHC RBC-ENTMCNC: 29.8 GM/DL
MCHC RBC-ENTMCNC: 31.2 PG
MCV RBC AUTO: 104.5 FL
MONOCLON BAND OBS SERPL: 0.2 G/DL
MONOCYTES # BLD AUTO: 0.39 K/UL
MONOCYTES NFR BLD AUTO: 5.9 %
NEUTROPHILS # BLD AUTO: 4.6 K/UL
NEUTROPHILS NFR BLD AUTO: 68.9 %
PLATELET # BLD AUTO: 221 K/UL
POTASSIUM SERPL-SCNC: 5.4 MMOL/L
PROT SERPL-MCNC: 8 G/DL
RBC # BLD: 3.53 M/UL
RBC # FLD: 12.8 %
SODIUM SERPL-SCNC: 144 MMOL/L
WBC # FLD AUTO: 6.66 K/UL

## 2021-01-27 ENCOUNTER — APPOINTMENT (OUTPATIENT)
Dept: HEMATOLOGY ONCOLOGY | Facility: CLINIC | Age: 76
End: 2021-01-27
Payer: MEDICARE

## 2021-01-27 VITALS
OXYGEN SATURATION: 95 % | BODY MASS INDEX: 30.66 KG/M2 | SYSTOLIC BLOOD PRESSURE: 191 MMHG | HEART RATE: 94 BPM | TEMPERATURE: 96.7 F | WEIGHT: 207 LBS | HEIGHT: 69 IN | DIASTOLIC BLOOD PRESSURE: 84 MMHG

## 2021-01-27 VITALS — SYSTOLIC BLOOD PRESSURE: 191 MMHG | DIASTOLIC BLOOD PRESSURE: 93 MMHG

## 2021-01-27 PROCEDURE — 99072 ADDL SUPL MATRL&STAF TM PHE: CPT

## 2021-01-27 PROCEDURE — 99214 OFFICE O/P EST MOD 30 MIN: CPT | Mod: 25

## 2021-01-27 RX ORDER — LOSARTAN POTASSIUM 50 MG/1
50 TABLET, FILM COATED ORAL
Refills: 0 | Status: DISCONTINUED | COMMUNITY
End: 2021-01-27

## 2021-01-27 RX ORDER — CARVEDILOL 6.25 MG/1
6.25 TABLET, FILM COATED ORAL
Qty: 60 | Refills: 0 | Status: DISCONTINUED | COMMUNITY
Start: 2020-11-18 | End: 2021-01-27

## 2021-02-05 NOTE — HISTORY OF PRESENT ILLNESS
[de-identified] : 75 years old female history of longstanding diabetes mellitus was found to have chronic renal failure with anemia and questionable monoclonal paraprotein... Patient is here with her daughter for hematology consultation.. [de-identified] : January 27, 2021 patient here for follow-up... According to daughter she drinks much less soda however she drinks more juice at this present time

## 2021-02-05 NOTE — ASSESSMENT
[FreeTextEntry1] : Repeat blood work Hb down again, but BUN/Cr stable...and Monoclonal paraprotein stable...Pt to see us in FU in 3 months, or sooner if needed...\par \par \par \par \par

## 2021-02-05 NOTE — CONSULT LETTER
[Dear  ___] : Dear  [unfilled], [Consult Letter:] : I had the pleasure of evaluating your patient, [unfilled]. [Please see my note below.] : Please see my note below. [Consult Closing:] : Thank you very much for allowing me to participate in the care of this patient.  If you have any questions, please do not hesitate to contact me. [Sincerely,] : Sincerely, [DrDesire  ___] : Dr. DOMINGUEZ [FreeTextEntry3] : Sendy Boyer MD\par

## 2021-02-07 LAB
ALBUMIN MFR SERPL ELPH: 52.2 %
ALBUMIN SERPL ELPH-MCNC: 4.1 G/DL
ALBUMIN SERPL-MCNC: 3.7 G/DL
ALBUMIN/GLOB SERPL: 1.1 RATIO
ALP BLD-CCNC: 72 U/L
ALPHA1 GLOB MFR SERPL ELPH: 4 %
ALPHA1 GLOB SERPL ELPH-MCNC: 0.3 G/DL
ALPHA2 GLOB MFR SERPL ELPH: 10.4 %
ALPHA2 GLOB SERPL ELPH-MCNC: 0.7 G/DL
ALT SERPL-CCNC: 8 U/L
ANION GAP SERPL CALC-SCNC: 12 MMOL/L
AST SERPL-CCNC: 14 U/L
B-GLOBULIN MFR SERPL ELPH: 11.7 %
B-GLOBULIN SERPL ELPH-MCNC: 0.8 G/DL
B2 MICROGLOB SERPL-MCNC: 6.8 MG/L
BASOPHILS # BLD AUTO: 0.03 K/UL
BASOPHILS NFR BLD AUTO: 0.5 %
BILIRUB SERPL-MCNC: 0.6 MG/DL
BUN SERPL-MCNC: 30 MG/DL
CALCIUM SERPL-MCNC: 9.8 MG/DL
CHLORIDE SERPL-SCNC: 106 MMOL/L
CO2 SERPL-SCNC: 23 MMOL/L
CREAT SERPL-MCNC: 1.83 MG/DL
DEPRECATED KAPPA LC FREE/LAMBDA SER: 2.3 RATIO
EOSINOPHIL # BLD AUTO: 0.32 K/UL
EOSINOPHIL NFR BLD AUTO: 5.4 %
ERYTHROCYTE [SEDIMENTATION RATE] IN BLOOD BY WESTERGREN METHOD: 25 MM/HR
ESTIMATED AVERAGE GLUCOSE: 128 MG/DL
GAMMA GLOB FLD ELPH-MCNC: 1.5 G/DL
GAMMA GLOB MFR SERPL ELPH: 21.7 %
GLUCOSE SERPL-MCNC: 144 MG/DL
HBA1C MFR BLD HPLC: 6.1 %
HCT VFR BLD CALC: 32.1 %
HGB BLD-MCNC: 9.6 G/DL
IGA SER QL IEP: 378 MG/DL
IGG SER QL IEP: 1745 MG/DL
IGM SER QL IEP: 106 MG/DL
IMM GRANULOCYTES NFR BLD AUTO: 0.3 %
INTERPRETATION SERPL IEP-IMP: NORMAL
KAPPA LC CSF-MCNC: 4.98 MG/DL
KAPPA LC SERPL-MCNC: 11.45 MG/DL
LYMPHOCYTES # BLD AUTO: 1.16 K/UL
LYMPHOCYTES NFR BLD AUTO: 19.4 %
M PROTEIN MFR SERPL ELPH: 2.7 %
M PROTEIN SPEC IFE-MCNC: NORMAL
MAN DIFF?: NORMAL
MCHC RBC-ENTMCNC: 29.9 GM/DL
MCHC RBC-ENTMCNC: 30.8 PG
MCV RBC AUTO: 102.9 FL
MONOCLON BAND OBS SERPL: 0.2 G/DL
MONOCYTES # BLD AUTO: 0.41 K/UL
MONOCYTES NFR BLD AUTO: 6.9 %
NEUTROPHILS # BLD AUTO: 4.04 K/UL
NEUTROPHILS NFR BLD AUTO: 67.5 %
PLATELET # BLD AUTO: 219 K/UL
POTASSIUM SERPL-SCNC: 5.2 MMOL/L
PROT SERPL-MCNC: 7.1 G/DL
RBC # BLD: 3.12 M/UL
RBC # FLD: 13.2 %
SODIUM SERPL-SCNC: 142 MMOL/L
WBC # FLD AUTO: 5.98 K/UL

## 2021-02-22 ENCOUNTER — APPOINTMENT (OUTPATIENT)
Dept: HEMATOLOGY ONCOLOGY | Facility: CLINIC | Age: 76
End: 2021-02-22

## 2021-03-10 ENCOUNTER — APPOINTMENT (OUTPATIENT)
Dept: ENDOCRINOLOGY | Facility: CLINIC | Age: 76
End: 2021-03-10

## 2021-03-11 ENCOUNTER — APPOINTMENT (OUTPATIENT)
Dept: NEPHROLOGY | Facility: CLINIC | Age: 76
End: 2021-03-11

## 2021-04-02 ENCOUNTER — APPOINTMENT (OUTPATIENT)
Dept: ENDOCRINOLOGY | Facility: CLINIC | Age: 76
End: 2021-04-02

## 2021-08-24 ENCOUNTER — RESULT REVIEW (OUTPATIENT)
Age: 76
End: 2021-08-24

## 2021-08-24 ENCOUNTER — NON-APPOINTMENT (OUTPATIENT)
Age: 76
End: 2021-08-24

## 2021-08-24 LAB
ALBUMIN SERPL ELPH-MCNC: 4.4 G/DL
ANION GAP SERPL CALC-SCNC: 12 MMOL/L
BASOPHILS # BLD AUTO: 0.03 K/UL
BASOPHILS NFR BLD AUTO: 0.5 %
BUN SERPL-MCNC: 68 MG/DL
CALCIUM SERPL-MCNC: 11 MG/DL
CALCIUM SERPL-MCNC: 11 MG/DL
CHLORIDE SERPL-SCNC: 99 MMOL/L
CO2 SERPL-SCNC: 30 MMOL/L
CREAT SERPL-MCNC: 3.85 MG/DL
EOSINOPHIL # BLD AUTO: 0.37 K/UL
EOSINOPHIL NFR BLD AUTO: 6.7 %
FERRITIN SERPL-MCNC: 242 NG/ML
GLUCOSE SERPL-MCNC: 124 MG/DL
HCT VFR BLD CALC: 32.6 %
HGB BLD-MCNC: 10.2 G/DL
IMM GRANULOCYTES NFR BLD AUTO: 0.2 %
IRON SATN MFR SERPL: 27 %
IRON SERPL-MCNC: 70 UG/DL
LYMPHOCYTES # BLD AUTO: 1.15 K/UL
LYMPHOCYTES NFR BLD AUTO: 20.9 %
MAN DIFF?: NORMAL
MCHC RBC-ENTMCNC: 31.3 GM/DL
MCHC RBC-ENTMCNC: 31.6 PG
MCV RBC AUTO: 100.9 FL
MONOCYTES # BLD AUTO: 0.43 K/UL
MONOCYTES NFR BLD AUTO: 7.8 %
NEUTROPHILS # BLD AUTO: 3.52 K/UL
NEUTROPHILS NFR BLD AUTO: 63.9 %
PARATHYROID HORMONE INTACT: 230 PG/ML
PHOSPHATE SERPL-MCNC: 3.4 MG/DL
PLATELET # BLD AUTO: 235 K/UL
POTASSIUM SERPL-SCNC: 5.9 MMOL/L
RBC # BLD: 3.23 M/UL
RBC # FLD: 13.7 %
SODIUM SERPL-SCNC: 141 MMOL/L
TIBC SERPL-MCNC: 262 UG/DL
UIBC SERPL-MCNC: 192 UG/DL
URATE SERPL-MCNC: 9.1 MG/DL
WBC # FLD AUTO: 5.51 K/UL

## 2021-08-30 ENCOUNTER — APPOINTMENT (OUTPATIENT)
Dept: NEPHROLOGY | Facility: CLINIC | Age: 76
End: 2021-08-30
Payer: MEDICARE

## 2021-08-30 ENCOUNTER — LABORATORY RESULT (OUTPATIENT)
Age: 76
End: 2021-08-30

## 2021-08-30 VITALS
SYSTOLIC BLOOD PRESSURE: 106 MMHG | WEIGHT: 206 LBS | HEART RATE: 88 BPM | DIASTOLIC BLOOD PRESSURE: 62 MMHG | HEIGHT: 69 IN | BODY MASS INDEX: 30.51 KG/M2

## 2021-08-30 DIAGNOSIS — K59.09 OTHER CONSTIPATION: ICD-10-CM

## 2021-08-30 PROCEDURE — 99215 OFFICE O/P EST HI 40 MIN: CPT

## 2021-08-30 RX ORDER — LACTULOSE 10 G/15ML
10 SOLUTION ORAL DAILY
Qty: 1 | Refills: 0 | Status: ACTIVE | COMMUNITY
Start: 2021-08-30 | End: 1900-01-01

## 2021-08-30 RX ORDER — CARVEDILOL 12.5 MG/1
12.5 TABLET, FILM COATED ORAL TWICE DAILY
Qty: 180 | Refills: 3 | Status: ACTIVE | COMMUNITY

## 2021-08-30 NOTE — HISTORY OF PRESENT ILLNESS
[FreeTextEntry1] : 76-year-old woman with a long history of hypertension and type 2 diabetes, CKD which has worsened dramatically in the last 9 months, CAD, anemia of CKD, gout, hyperlipidemia, chronic constipation.  Her creatinine was 1.8 in January, but is now 3.85 with a GFR of 12.  She is hypercalcemic with a calcium of 11 and a K of 5.9.  She has not used NSAIDs.  Her BP has been well controlled on losartan 100 mg daily, carvedilol 12.5 mg twice daily, and furosemide 40 mg daily.  Her appetite is still reasonably preserved, with no nausea or vomiting.  She denies back or bone pain.  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 Boyer in quite some time.  Her hemoglobin is 10.2, .  She is taking vitamin D3, but no calcium supplement as far as I can tell.  Her PCP is Dr. Fausto Mills, whom she has not seen in some time either.  An ultrasound done in July 2020 showed echogenic kidneys with no hint of obstructive uropathy.  They were normal in size.

## 2021-08-30 NOTE — PHYSICAL EXAM
[General Appearance - Alert] : alert [General Appearance - In No Acute Distress] : in no acute distress [Sclera] : the sclera and conjunctiva were normal [PERRL With Normal Accommodation] : pupils were equal in size, round, and reactive to light [Outer Ear] : the ears and nose were normal in appearance [Neck Appearance] : the appearance of the neck was normal [Neck Cervical Mass (___cm)] : no neck mass was observed [Jugular Venous Distention Increased] : there was no jugular-venous distention [Auscultation Breath Sounds / Voice Sounds] : lungs were clear to auscultation bilaterally [Heart Rate And Rhythm] : heart rate was normal and rhythm regular [Heart Sounds] : normal S1 and S2 [Heart Sounds Gallop] : no gallops [Murmurs] : no murmurs [Heart Sounds Pericardial Friction Rub] : no pericardial rub [Edema] : there was no peripheral edema [Bowel Sounds] : normal bowel sounds [Abdomen Soft] : soft [Abdomen Tenderness] : non-tender [Abdomen Mass (___ Cm)] : no abdominal mass palpated [Cervical Lymph Nodes Enlarged Posterior Bilaterally] : posterior cervical [Cervical Lymph Nodes Enlarged Anterior Bilaterally] : anterior cervical [Supraclavicular Lymph Nodes Enlarged Bilaterally] : supraclavicular [No CVA Tenderness] : no ~M costovertebral angle tenderness [No Spinal Tenderness] : no spinal tenderness [Nail Clubbing] : no clubbing  or cyanosis of the fingernails [Musculoskeletal - Swelling] : no joint swelling seen [Skin Color & Pigmentation] : normal skin color and pigmentation [Motor Tone] : muscle strength and tone were normal [Skin Turgor] : normal skin turgor [] : no rash [Deep Tendon Reflexes (DTR)] : deep tendon reflexes were 2+ and symmetric [No Focal Deficits] : no focal deficits

## 2021-08-30 NOTE — ASSESSMENT
[FreeTextEntry1] : 76-year-old woman with a dramatic worsening of renal function in the last 9 months.  Her creatinine ezequiel from 1.83 in January to 3.85 now with a BUN of 68 and GFR of 12.  She is not overtly uremic, but is hyperkalemic again, perhaps partly because she has been drinking a lot of orange juice.  I have ordered a renal ultrasound to rule out new obstructive uropathy explaining her sudden deterioration.  I have ordered repeat labs to include BMP, screening for light chains, urine immunoelectrophoresis.  I asked her to stop vitamin D3, losartan, orange juice.  She will return within 5 to 6 weeks, but possibly sooner depending on renal function and hyperkalemia.  I also asked her to see Dr. Boyer -I am wondering if her hypercalcemia could signal that her monoclonal gammopathy morphed into multiple myeloma.  I reviewed all of the changes with her daughter and wrote them out on a separate sheet.

## 2021-08-31 LAB
ANION GAP SERPL CALC-SCNC: 10 MMOL/L
BUN SERPL-MCNC: 62 MG/DL
CALCIUM SERPL-MCNC: 10.4 MG/DL
CHLORIDE SERPL-SCNC: 102 MMOL/L
CO2 SERPL-SCNC: 28 MMOL/L
CREAT SERPL-MCNC: 3.25 MG/DL
DEPRECATED KAPPA LC FREE/LAMBDA SER: 1.44 RATIO
GLUCOSE SERPL-MCNC: 130 MG/DL
KAPPA LC CSF-MCNC: 5.8 MG/DL
KAPPA LC SERPL-MCNC: 8.35 MG/DL
POTASSIUM SERPL-SCNC: 4.7 MMOL/L
SODIUM SERPL-SCNC: 140 MMOL/L

## 2021-09-07 ENCOUNTER — OUTPATIENT (OUTPATIENT)
Dept: OUTPATIENT SERVICES | Facility: HOSPITAL | Age: 76
LOS: 1 days | End: 2021-09-07
Payer: MEDICARE

## 2021-09-07 ENCOUNTER — APPOINTMENT (OUTPATIENT)
Dept: NEPHROLOGY | Facility: CLINIC | Age: 76
End: 2021-09-07
Payer: MEDICARE

## 2021-09-07 ENCOUNTER — APPOINTMENT (OUTPATIENT)
Dept: ULTRASOUND IMAGING | Facility: HOSPITAL | Age: 76
End: 2021-09-07
Payer: MEDICARE

## 2021-09-07 VITALS
SYSTOLIC BLOOD PRESSURE: 134 MMHG | DIASTOLIC BLOOD PRESSURE: 75 MMHG | BODY MASS INDEX: 30.36 KG/M2 | HEIGHT: 69 IN | HEART RATE: 84 BPM | WEIGHT: 205 LBS

## 2021-09-07 LAB — IGA 24H UR QL IFE: NORMAL

## 2021-09-07 PROCEDURE — 99214 OFFICE O/P EST MOD 30 MIN: CPT

## 2021-09-07 PROCEDURE — 76770 US EXAM ABDO BACK WALL COMP: CPT

## 2021-09-07 PROCEDURE — 76770 US EXAM ABDO BACK WALL COMP: CPT | Mod: 26

## 2021-09-07 NOTE — ASSESSMENT
[FreeTextEntry1] : 76-year-old woman with gradual deterioration in renal function probably due to a combination of diabetic nephropathy and hypertensive nephrosclerosis -she is not currently experiencing any overt uremic symptoms, had a GFR of 15, and her BP is excellent.  It appears that the reading in the pharmacy today was an error.  I have asked her to remain on amlodipine 5 mg daily and furosemide 40 mg daily.  Her K is now in good control.  She will have labs drawn in 3-1/2 weeks, to include BMP and plasma potassium.  She will follow that up with a visit within 3 days.

## 2021-09-07 NOTE — CONSULT LETTER
[Dear  ___] : Dear  [unfilled], [Consult Letter:] : I had the pleasure of evaluating your patient, [unfilled]. [Please see my note below.] : Please see my note below. [Consult Closing:] : Thank you very much for allowing me to participate in the care of this patient.  If you have any questions, please do not hesitate to contact me. [Sincerely,] : Sincerely, [FreeTextEntry2] : Fausto suazo [FreeTextEntry3] : Sincerely, \par \par Rajan Quigley MD, FACP

## 2021-09-07 NOTE — HISTORY OF PRESENT ILLNESS
[FreeTextEntry1] : 76-year-old woman with a long history of hypertension and type 2 diabetes, not I daily controlled -her CKD has worsened in the last 9 to 10 months, with her creatinine up to 3.85 recently, but improved to 3.25 last week.  Her K had risen to 5.9, but is now down to 4.7 after 1 week off losartan and on sodium bicarbonate.  Hypercalcemia resolved, which may account for the improvement in renal function.  Her BP has recently been very well controlled, and was 106/62 a week ago here in our office.  However, it was 170/96 today by a nurse at the local pharmacy.  So she came over here with her daughter to check things out.  She is on furosemide 40 mg daily.  She is not clinically uremic, with a GFR of 15.  Her hemoglobin is 10.2, with a history of monoclonal gammopathy.  I asked her to see Dr. Boyer in regard to possibility of that morphing into something more, such as multiple myeloma.  It turns out she is on amlodipine 5 mg daily, which we did not have listed.

## 2021-10-07 ENCOUNTER — APPOINTMENT (OUTPATIENT)
Dept: NEPHROLOGY | Facility: CLINIC | Age: 76
End: 2021-10-07
Payer: MEDICARE

## 2021-10-07 VITALS
HEIGHT: 69 IN | HEART RATE: 80 BPM | DIASTOLIC BLOOD PRESSURE: 74 MMHG | SYSTOLIC BLOOD PRESSURE: 108 MMHG | BODY MASS INDEX: 30.21 KG/M2 | WEIGHT: 204 LBS

## 2021-10-07 DIAGNOSIS — N18.5 CHRONIC KIDNEY DISEASE, STAGE 5: ICD-10-CM

## 2021-10-07 PROCEDURE — 99214 OFFICE O/P EST MOD 30 MIN: CPT

## 2021-10-07 NOTE — CONSULT LETTER
[Dear  ___] : Dear  [unfilled], [Consult Letter:] : I had the pleasure of evaluating your patient, [unfilled]. [Please see my note below.] : Please see my note below. [Consult Closing:] : Thank you very much for allowing me to participate in the care of this patient.  If you have any questions, please do not hesitate to contact me. [Sincerely,] : Sincerely, [FreeTextEntry2] : Dr eleanor suazo [FreeTextEntry3] : Sincerely, \par \par Rajan Quigley MD, FACP

## 2021-10-07 NOTE — REVIEW OF SYSTEMS
[Arthralgias] : arthralgias [Joint Stiffness] : joint stiffness [Limb Pain] : limb pain [Limb Weakness] : limb weakness [Difficulty Walking] : difficulty walking [Negative] : Heme/Lymph

## 2021-10-07 NOTE — HISTORY OF PRESENT ILLNESS
[FreeTextEntry1] : 76-year-old woman with a long history of hypertension and type 2 diabetes, and worsening of CKD in the last year.  Her  recent creatinine was up to 3.85, but dropped to 3.25 a month ago.  Her K had risen to 5.9 but dropped to 4.7 off losartan and on sodium bicarbonate.  Hypercalcemia also resolved, which may account for her improved creatinine.  Her BP has been very well controlled on amlodipine 10 mg only.  Her appetite is good.  Her last hemoglobin was 10.2, with a history of monoclonal gammopathy.  When she last saw Dr. Boyer, there was no evidence of myeloma.  She is not clinically uremic.  Her biggest complaint is of back pain, inability to walk more than 1/4-1/2 block.  Her daughter would like her to see a specialist about that, and she has a ready seen a rheumatologist in the past.

## 2021-10-07 NOTE — ASSESSMENT
[FreeTextEntry1] : 76-year-old woman with CKD 5, anemia, monoclonal gammopathy, hypertension, type 2 diabetes -her BP is beautifully controlled without lightheadedness or dizziness.  She does not have any overt uremic symptoms.  Her major complaint is of mobility issues.  I have referred her to neurology here at [facility].  She will continue on amlodipine 10 mg daily.  She does not have any gout symptoms at this time.  I have ordered labs to include BMP, H&H, iron/TIBC, and A1c, phosphorus, PTH, screening for light chains, and she will return in 2 months.  She will follow up regularly with Dr. Mills, her PCP.

## 2021-10-11 LAB
ANION GAP SERPL CALC-SCNC: 13 MMOL/L
BUN SERPL-MCNC: 32 MG/DL
CALCIUM SERPL-MCNC: 10.7 MG/DL
CALCIUM SERPL-MCNC: 10.7 MG/DL
CHLORIDE SERPL-SCNC: 107 MMOL/L
CO2 SERPL-SCNC: 23 MMOL/L
CREAT SERPL-MCNC: 2.11 MG/DL
DEPRECATED KAPPA LC FREE/LAMBDA SER: 1.62 RATIO
GLUCOSE SERPL-MCNC: 157 MG/DL
HCT VFR BLD CALC: 33.4 %
HGB BLD-MCNC: 10.2 G/DL
IRON SATN MFR SERPL: 25 %
IRON SERPL-MCNC: 58 UG/DL
KAPPA LC CSF-MCNC: 5.53 MG/DL
KAPPA LC SERPL-MCNC: 8.97 MG/DL
PARATHYROID HORMONE INTACT: 165 PG/ML
PHOSPHATE SERPL-MCNC: 3 MG/DL
POTASSIUM SERPL-SCNC: 5.6 MMOL/L
SODIUM SERPL-SCNC: 143 MMOL/L
TIBC SERPL-MCNC: 237 UG/DL
UIBC SERPL-MCNC: 179 UG/DL

## 2021-12-14 ENCOUNTER — APPOINTMENT (OUTPATIENT)
Dept: NEPHROLOGY | Facility: CLINIC | Age: 76
End: 2021-12-14

## 2022-01-06 ENCOUNTER — APPOINTMENT (OUTPATIENT)
Dept: NEUROLOGY | Facility: CLINIC | Age: 77
End: 2022-01-06
Payer: MEDICARE

## 2022-01-06 VITALS
WEIGHT: 200 LBS | HEIGHT: 69 IN | BODY MASS INDEX: 29.62 KG/M2 | TEMPERATURE: 98.1 F | HEART RATE: 101 BPM | SYSTOLIC BLOOD PRESSURE: 146 MMHG | OXYGEN SATURATION: 90 % | DIASTOLIC BLOOD PRESSURE: 79 MMHG

## 2022-01-06 DIAGNOSIS — M79.606 PAIN IN LEG, UNSPECIFIED: ICD-10-CM

## 2022-01-06 PROCEDURE — 99205 OFFICE O/P NEW HI 60 MIN: CPT

## 2022-01-06 NOTE — HISTORY OF PRESENT ILLNESS
[FreeTextEntry1] : Reason for consult: back pain\par \par HPI: RAJESH BRYANT is a 76 year old woman \par \par Has been having more difficulties with walking since the pandemic.\par Develops pain down the RLE when walking, even after a 1/2 block, x8-9months, from hip all the way down to the foot. Also feels like R foot numbness involving primarily the dorsum of R foot. Has noted RLE weakness x8-9 months. Sits down for a while, then can walk again without pain for a while. No back pain or pain in buttocks.\par Has used a walker for >7 years due to BL knee arthritis. Never got surgery. Knees occasionally hurt but she doesn't feel like that's what is interfering. \par Severe bladder urgency x1y, wears pads.\par No bowel dysfunction.\par \par ROS/Current Sx:\par pain\par weakness\par bladder dysfunction\par \par PMHX:\par CAD\par CKD\par anemia\par HTN\par DM2\par \par MEDS:\par asa81\par carvedilol\par plavix\par metformin\par sodium bicarbonate\par lipitor\par \par ALL: PCN\par \par SHx: quite remotely, no drugs.\par \par FHx: NC\par \par Vitals: slightly hypertensive, i advised to discuss with pmd/renal\par \par Exam:\par \par AO3.  Normally conversant.  Follows commands, names, and repeats.  Good attention.\par \par PERRL, VFF, EOMI, no nystagmus, face symmetric, TUP at midline.\par \par Motor: \par                                                 R:                               L:\par Del                                           5                                5\par Bi                                              5                               5\par Tri                                            5                               5\par Wrist Extensors                      5                               5\par Finger abductors                    5                               5\par                                         5                               5 \par \par HF                                           4+                             4+\par KE                                           5-                               5-\par KF                                           5                               5\par DF                                           5                               5-\par PF                                           5                               5\par \par Tone                                       R                               L\par UE                                          0                                0 \par LE                                          0                                0\par \par Sensory                                RUE                      LUE                 RLE                LLE     \par LT                                           +                            +                      +                   +\par Vib                                          +                            +                      +                   +\par JPS                                         +                            +                      +                   +\par PP                                         +                            +                      +                   +\par Temp                                     +                            +                      +                   +\par \par Reflexes:\par                                              R                             L                            \par Biceps                                  2                             2\par BR                                        2                             2\par Pat                                        abs                       abs\par AJ                                       abs                           abs\par \par Coordination:\par                                              R                             L                       \par FTN                                       0                             0 \par RONALD                                      0                            0\par \par Other                                                                          \par  \par Gait: no dragging of either leg, no circumduction or steppage, good step size and narrow\par \par                     Assistance: rolling walker\par \par \par AP: 77yo w/ gait impairment 2/2 RLE pain and BL LE weakness since early 2021. Pain appears to be radicular but she does have some mild proximal leg weakness on exam. She also has had severe urinary dysfunction that occurred at roughly the same time. \par \par all questions answered, education provided, management discussed at length.\par \par - MRI L spine (r/o cauda equina, pt with leg weakness, pain, and urinary dysfunction)\par - EMG to evaluate for neuropathy/myopathy\par - PT\par - pain management for possible epidural injections.\par - RTC after testing\par \par \par

## 2022-01-11 ENCOUNTER — APPOINTMENT (OUTPATIENT)
Dept: NEUROLOGY | Facility: CLINIC | Age: 77
End: 2022-01-11

## 2022-01-18 ENCOUNTER — FORM ENCOUNTER (OUTPATIENT)
Age: 77
End: 2022-01-18

## 2022-01-21 ENCOUNTER — APPOINTMENT (OUTPATIENT)
Dept: MRI IMAGING | Facility: HOSPITAL | Age: 77
End: 2022-01-21

## 2022-01-21 ENCOUNTER — OUTPATIENT (OUTPATIENT)
Dept: OUTPATIENT SERVICES | Facility: HOSPITAL | Age: 77
LOS: 1 days | End: 2022-01-21
Payer: MEDICARE

## 2022-01-21 PROCEDURE — 72148 MRI LUMBAR SPINE W/O DYE: CPT

## 2022-01-21 PROCEDURE — 72148 MRI LUMBAR SPINE W/O DYE: CPT | Mod: 26

## 2022-02-01 ENCOUNTER — NON-APPOINTMENT (OUTPATIENT)
Age: 77
End: 2022-02-01

## 2022-02-03 ENCOUNTER — NON-APPOINTMENT (OUTPATIENT)
Age: 77
End: 2022-02-03

## 2022-02-09 ENCOUNTER — LABORATORY RESULT (OUTPATIENT)
Age: 77
End: 2022-02-09

## 2022-02-10 LAB
ALBUMIN SERPL ELPH-MCNC: 4.6 G/DL
ANION GAP SERPL CALC-SCNC: 17 MMOL/L
APPEARANCE: ABNORMAL
BASOPHILS # BLD AUTO: 0.05 K/UL
BASOPHILS NFR BLD AUTO: 0.8 %
BILIRUBIN URINE: NEGATIVE
BLOOD URINE: NEGATIVE
BUN SERPL-MCNC: 35 MG/DL
CALCIUM SERPL-MCNC: 10.3 MG/DL
CALCIUM SERPL-MCNC: 10.3 MG/DL
CHLORIDE SERPL-SCNC: 101 MMOL/L
CO2 SERPL-SCNC: 27 MMOL/L
COLOR: YELLOW
CREAT SERPL-MCNC: 2.36 MG/DL
CREAT SPEC-SCNC: 191 MG/DL
EOSINOPHIL # BLD AUTO: 0.38 K/UL
EOSINOPHIL NFR BLD AUTO: 5.7 %
FERRITIN SERPL-MCNC: 168 NG/ML
GLUCOSE QUALITATIVE U: NEGATIVE
GLUCOSE SERPL-MCNC: 138 MG/DL
HCT VFR BLD CALC: 36.8 %
HGB BLD-MCNC: 11.2 G/DL
IMM GRANULOCYTES NFR BLD AUTO: 0.3 %
IRON SATN MFR SERPL: 24 %
IRON SERPL-MCNC: 60 UG/DL
KETONES URINE: NEGATIVE
LEUKOCYTE ESTERASE URINE: ABNORMAL
LYMPHOCYTES # BLD AUTO: 1.66 K/UL
LYMPHOCYTES NFR BLD AUTO: 24.9 %
MAN DIFF?: NORMAL
MCHC RBC-ENTMCNC: 30.1 PG
MCHC RBC-ENTMCNC: 30.4 GM/DL
MCV RBC AUTO: 98.9 FL
MICROALBUMIN 24H UR DL<=1MG/L-MCNC: 107 MG/DL
MICROALBUMIN/CREAT 24H UR-RTO: 560 MG/G
MONOCYTES # BLD AUTO: 0.34 K/UL
MONOCYTES NFR BLD AUTO: 5.1 %
NEUTROPHILS # BLD AUTO: 4.21 K/UL
NEUTROPHILS NFR BLD AUTO: 63.2 %
NITRITE URINE: NEGATIVE
PARATHYROID HORMONE INTACT: 382 PG/ML
PH URINE: 6.5
PHOSPHATE SERPL-MCNC: 3.7 MG/DL
PLATELET # BLD AUTO: 318 K/UL
POTASSIUM SERPL-SCNC: 4.7 MMOL/L
PROTEIN URINE: ABNORMAL
RBC # BLD: 3.72 M/UL
RBC # FLD: 14 %
SODIUM SERPL-SCNC: 146 MMOL/L
SPECIFIC GRAVITY URINE: 1.02
TIBC SERPL-MCNC: 255 UG/DL
UIBC SERPL-MCNC: 194 UG/DL
URATE SERPL-MCNC: 7.9 MG/DL
UROBILINOGEN URINE: ABNORMAL
WBC # FLD AUTO: 6.66 K/UL

## 2022-02-23 ENCOUNTER — RESULT REVIEW (OUTPATIENT)
Age: 77
End: 2022-02-23

## 2022-02-23 ENCOUNTER — OUTPATIENT (OUTPATIENT)
Dept: OUTPATIENT SERVICES | Facility: HOSPITAL | Age: 77
LOS: 1 days | End: 2022-02-23
Payer: MEDICARE

## 2022-02-23 ENCOUNTER — APPOINTMENT (OUTPATIENT)
Dept: SPINE | Facility: CLINIC | Age: 77
End: 2022-02-23
Payer: MEDICARE

## 2022-02-23 ENCOUNTER — NON-APPOINTMENT (OUTPATIENT)
Age: 77
End: 2022-02-23

## 2022-02-23 VITALS
DIASTOLIC BLOOD PRESSURE: 100 MMHG | WEIGHT: 200 LBS | RESPIRATION RATE: 17 BRPM | SYSTOLIC BLOOD PRESSURE: 160 MMHG | HEIGHT: 69 IN | OXYGEN SATURATION: 93 % | TEMPERATURE: 97.6 F | BODY MASS INDEX: 29.62 KG/M2 | HEART RATE: 103 BPM

## 2022-02-23 DIAGNOSIS — M40.209 UNSPECIFIED KYPHOSIS, SITE UNSPECIFIED: ICD-10-CM

## 2022-02-23 PROCEDURE — 99204 OFFICE O/P NEW MOD 45 MIN: CPT

## 2022-02-23 PROCEDURE — 73560 X-RAY EXAM OF KNEE 1 OR 2: CPT | Mod: 26,LT,RT

## 2022-02-23 PROCEDURE — 72084 X-RAY EXAM ENTIRE SPI 6/> VW: CPT | Mod: 26

## 2022-02-23 PROCEDURE — 73560 X-RAY EXAM OF KNEE 1 OR 2: CPT

## 2022-02-23 PROCEDURE — 72100 X-RAY EXAM L-S SPINE 2/3 VWS: CPT

## 2022-02-23 PROCEDURE — 73522 X-RAY EXAM HIPS BI 3-4 VIEWS: CPT | Mod: 26

## 2022-02-23 PROCEDURE — 73522 X-RAY EXAM HIPS BI 3-4 VIEWS: CPT

## 2022-02-23 PROCEDURE — 72082 X-RAY EXAM ENTIRE SPI 2/3 VW: CPT

## 2022-02-24 PROBLEM — M40.209 KYPHOSIS DEFORMITY OF SPINE: Status: ACTIVE | Noted: 2022-02-23

## 2022-02-25 ENCOUNTER — NON-APPOINTMENT (OUTPATIENT)
Age: 77
End: 2022-02-25

## 2022-03-03 ENCOUNTER — APPOINTMENT (OUTPATIENT)
Dept: NEPHROLOGY | Facility: CLINIC | Age: 77
End: 2022-03-03
Payer: MEDICARE

## 2022-03-03 VITALS
SYSTOLIC BLOOD PRESSURE: 135 MMHG | HEIGHT: 69 IN | BODY MASS INDEX: 29.47 KG/M2 | DIASTOLIC BLOOD PRESSURE: 68 MMHG | WEIGHT: 199 LBS | HEART RATE: 80 BPM

## 2022-03-03 DIAGNOSIS — E83.52 HYPERCALCEMIA: ICD-10-CM

## 2022-03-03 DIAGNOSIS — D47.2 MONOCLONAL GAMMOPATHY: ICD-10-CM

## 2022-03-03 DIAGNOSIS — I25.10 ATHEROSCLEROTIC HEART DISEASE OF NATIVE CORONARY ARTERY W/OUT ANGINA PECTORIS: ICD-10-CM

## 2022-03-03 DIAGNOSIS — Z82.49 FAMILY HISTORY OF ISCHEMIC HEART DISEASE AND OTHER DISEASES OF THE CIRCULATORY SYSTEM: ICD-10-CM

## 2022-03-03 DIAGNOSIS — Z83.3 FAMILY HISTORY OF DIABETES MELLITUS: ICD-10-CM

## 2022-03-03 DIAGNOSIS — Z87.891 PERSONAL HISTORY OF NICOTINE DEPENDENCE: ICD-10-CM

## 2022-03-03 DIAGNOSIS — R13.10 DYSPHAGIA, UNSPECIFIED: ICD-10-CM

## 2022-03-03 PROCEDURE — 99214 OFFICE O/P EST MOD 30 MIN: CPT

## 2022-03-03 NOTE — HISTORY OF PRESENT ILLNESS
[FreeTextEntry1] : 76-year-old woman with a long history of hypertension and type 2 diabetes, who CKD worsened in the last year.  Her creatinine peaked at 3.85 but dropped to 3.25 in September, and by February was down to 2.36, with a BUN of 35, GFR of 19-22, K down to 4.7, CO2 up to 27 on sodium bicarbonate, PTH up to 382 from the 200 range, off vitamin D, and hemoglobin stable at 11.2.  Her appetite is excellent.  Her blood sugar was 138.  Edema of the left leg has been minimal and none in the right leg.  She is trying to minimize the use of furosemide, along with her daughter Annie's guidance.  Her BP has been good on amlodipine 10 mg daily.  She has been off losartan and carvedilol.

## 2022-03-03 NOTE — ASSESSMENT
[FreeTextEntry1] : 76-year-old woman with CKD probably due to diabetic nephropathy and hypertensive nephrosclerosis -I am very pleased with her improved renal function, but concerned about worsening secondary hyperparathyroidism with her PTH up to nearly 400.  Unfortunately, we had to stop use of calcitriol because of hypercalcemia which would further worsen her renal function.  Even now her calcium is still 10.3 off vitamin D.  Thus the only means of treating her SH PT is adding cinacalcet 30 mg daily -I anticipate we will need obstacles in getting approval for that.  We will nonetheless try.  I have asked her to reduce sodium bicarbonate to 1 daily because her CO2 is up to 27.  I asked her daughter Annie to try to avoid furosemide altogether and only use 20 mg when edema is intolerable.  Today she has absolutely no edema in the left leg or right leg.  I have ordered labs to be done in 3 months, to include BMP, H&H, PTH, phosphorus, and statin C.  She will make a visit shortly after that.  She will follow up with Dr. Mills.

## 2022-03-03 NOTE — CONSULT LETTER
[Dear  ___] : Dear  [unfilled], [Consult Letter:] : I had the pleasure of evaluating your patient, [unfilled]. [Please see my note below.] : Please see my note below. [Consult Closing:] : Thank you very much for allowing me to participate in the care of this patient.  If you have any questions, please do not hesitate to contact me. [Sincerely,] : Sincerely, [FreeTextEntry2] : Dr Fausto Mills [FreeTextEntry3] : Sincerely, \par \par Rajan Quigley MD, FACP

## 2022-03-09 ENCOUNTER — APPOINTMENT (OUTPATIENT)
Dept: NEUROLOGY | Facility: CLINIC | Age: 77
End: 2022-03-09

## 2022-03-10 ENCOUNTER — RESULT REVIEW (OUTPATIENT)
Age: 77
End: 2022-03-10

## 2022-03-10 ENCOUNTER — APPOINTMENT (OUTPATIENT)
Dept: RADIOLOGY | Facility: HOSPITAL | Age: 77
End: 2022-03-10

## 2022-03-10 ENCOUNTER — APPOINTMENT (OUTPATIENT)
Dept: CT IMAGING | Facility: HOSPITAL | Age: 77
End: 2022-03-10

## 2022-03-10 ENCOUNTER — OUTPATIENT (OUTPATIENT)
Dept: OUTPATIENT SERVICES | Facility: HOSPITAL | Age: 77
LOS: 1 days | End: 2022-03-10
Payer: MEDICARE

## 2022-03-10 PROCEDURE — 77080 DXA BONE DENSITY AXIAL: CPT

## 2022-03-10 PROCEDURE — 72131 CT LUMBAR SPINE W/O DYE: CPT

## 2022-03-10 PROCEDURE — 77080 DXA BONE DENSITY AXIAL: CPT | Mod: 26

## 2022-03-10 PROCEDURE — 72131 CT LUMBAR SPINE W/O DYE: CPT | Mod: 26

## 2022-03-16 DIAGNOSIS — Z01.818 ENCOUNTER FOR OTHER PREPROCEDURAL EXAMINATION: ICD-10-CM

## 2022-03-16 DIAGNOSIS — M51.27 OTHER INTERVERTEBRAL DISC DISPLACEMENT, LUMBOSACRAL REGION: ICD-10-CM

## 2022-03-16 DIAGNOSIS — Z78.0 ASYMPTOMATIC MENOPAUSAL STATE: ICD-10-CM

## 2022-03-16 DIAGNOSIS — M51.26 OTHER INTERVERTEBRAL DISC DISPLACEMENT, LUMBAR REGION: ICD-10-CM

## 2022-03-16 DIAGNOSIS — M85.89 OTHER SPECIFIED DISORDERS OF BONE DENSITY AND STRUCTURE, MULTIPLE SITES: ICD-10-CM

## 2022-03-16 DIAGNOSIS — M48.061 SPINAL STENOSIS, LUMBAR REGION WITHOUT NEUROGENIC CLAUDICATION: ICD-10-CM

## 2022-03-16 DIAGNOSIS — Z13.820 ENCOUNTER FOR SCREENING FOR OSTEOPOROSIS: ICD-10-CM

## 2022-03-25 ENCOUNTER — APPOINTMENT (OUTPATIENT)
Dept: NEUROLOGY | Facility: CLINIC | Age: 77
End: 2022-03-25
Payer: MEDICARE

## 2022-03-25 VITALS
SYSTOLIC BLOOD PRESSURE: 141 MMHG | HEART RATE: 118 BPM | WEIGHT: 199 LBS | DIASTOLIC BLOOD PRESSURE: 83 MMHG | HEIGHT: 69 IN | TEMPERATURE: 97.9 F | OXYGEN SATURATION: 96 % | BODY MASS INDEX: 29.47 KG/M2

## 2022-03-25 PROCEDURE — 99213 OFFICE O/P EST LOW 20 MIN: CPT

## 2022-03-25 NOTE — HISTORY OF PRESENT ILLNESS
[FreeTextEntry1] : Initial hx 1/2022\par Has been having more difficulties with walking since the pandemic.\par Develops pain down the RLE when walking, even after a 1/2 block, x8-9months, from hip all the way down to the foot. Also feels like R foot numbness involving primarily the dorsum of R foot. Has noted RLE weakness x8-9 months. Sits down for a while, then can walk again without pain for a while. No back pain or pain in buttocks.\par Has used a walker for >7 years due to BL knee arthritis. Never got surgery. Knees occasionally hurt but she doesn't feel like that's what is interfering. \par Severe bladder urgency x1y, wears pads.\par No bowel dysfunction.\par \par \par Subj interval:\par \par Here for follow up to discuss tests.\par \par \par ROS/Current Sx:\par pain\par weakness\par bladder dysfunction\par \par PMHX:\par CAD\par CKD\par anemia\par HTN\par DM2\par \par MEDS:\par asa81\par carvedilol\par plavix\par metformin\par sodium bicarbonate\par lipitor\par \par \par O:\par \par Vitals: slightly hypertensive, i advised to discuss with pmd/renal\par \par Exam:\par \par AO3. Normally conversant. Follows commands, names, and repeats. Good attention.\par \par PERRL, VFF, EOMI, no nystagmus, face symmetric, TUP at midline.\par \par Motor: \par    R:  L:\par Del   5  5\par Bi   5  5\par Tri   5  5\par Wrist Extensors  5  5\par Finger abductors  5  5\par    5  5 \par \par HF   4+  4+\par KE   5  5\par KF   5  5\par DF   5  5\par PF   5-  5-\par \par Tone   R  L\par UE   0  0 \par LE   0  0\par \par Sensory  RUE  LUE  RLE LLE \par LT   +  +  +  +\par Vib   +  +  +  +\par JPS   +  +  +  +\par PP   +  +  +  +\par Temp   +  +  +  +\par \par Reflexes:\par    R  L  \par Biceps   2  2\par BR   2  2\par Pat   abs  abs\par AJ   abs  abs\par \par Coordination:\par    R  L  \par FTN   0  0 \par RONALD   0  0\par \par Other     \par  \par Gait: no dragging of either leg, no circumduction or steppage, good step size and narrow\par \par   Assistance: rolling walker\par \par \par EMG 1/2022 - e/o chronic sensorimotor neuropathy, BL CTS\par \par MRI L spine 1/2022 - showed severe lumbar stenosis at several levels.\par \par \par AP: 75yo w/ gait impairment 2/2 RLE pain and BL LE weakness since early 2021. Pain appears to be radicular but she does have some mild proximal leg weakness on exam. She also has had severe urinary dysfunction that occurred at roughly the same time. \par \par all questions answered, education provided, management discussed at length.\par \par - f/u w/ neurosurgery\par - advised PT and pain management for possible epidural injections if it is deemed there is no neurosurgical option.

## 2022-03-30 ENCOUNTER — APPOINTMENT (OUTPATIENT)
Dept: SPINE | Facility: CLINIC | Age: 77
End: 2022-03-30
Payer: MEDICARE

## 2022-03-30 VITALS
SYSTOLIC BLOOD PRESSURE: 141 MMHG | OXYGEN SATURATION: 95 % | HEART RATE: 119 BPM | TEMPERATURE: 96.3 F | WEIGHT: 199 LBS | DIASTOLIC BLOOD PRESSURE: 88 MMHG | BODY MASS INDEX: 29.47 KG/M2 | RESPIRATION RATE: 18 BRPM | HEIGHT: 69 IN

## 2022-03-30 DIAGNOSIS — Z86.79 PERSONAL HISTORY OF OTHER DISEASES OF THE CIRCULATORY SYSTEM: ICD-10-CM

## 2022-03-30 DIAGNOSIS — R26.9 UNSPECIFIED ABNORMALITIES OF GAIT AND MOBILITY: ICD-10-CM

## 2022-03-30 DIAGNOSIS — M54.16 RADICULOPATHY, LUMBAR REGION: ICD-10-CM

## 2022-03-30 DIAGNOSIS — R29.898 OTHER SYMPTOMS AND SIGNS INVOLVING THE MUSCULOSKELETAL SYSTEM: ICD-10-CM

## 2022-03-30 DIAGNOSIS — M48.061 SPINAL STENOSIS, LUMBAR REGION WITHOUT NEUROGENIC CLAUDICATION: ICD-10-CM

## 2022-03-30 PROCEDURE — 99214 OFFICE O/P EST MOD 30 MIN: CPT

## 2022-03-31 PROBLEM — M54.16 LUMBAR RADICULOPATHY, RIGHT: Status: ACTIVE | Noted: 2022-01-06

## 2022-03-31 PROBLEM — Z86.79 HISTORY OF AORTIC VALVE STENOSIS: Status: RESOLVED | Noted: 2022-03-30 | Resolved: 2022-03-31

## 2022-03-31 PROBLEM — R26.9 GAIT DISTURBANCE: Status: ACTIVE | Noted: 2022-02-23

## 2022-03-31 PROBLEM — M48.061 LUMBAR SPINAL STENOSIS: Status: ACTIVE | Noted: 2022-02-03

## 2022-03-31 PROBLEM — R29.898 WEAKNESS OF BOTH LOWER EXTREMITIES: Status: ACTIVE | Noted: 2022-01-06

## 2022-05-09 ENCOUNTER — APPOINTMENT (OUTPATIENT)
Dept: NEUROLOGY | Facility: CLINIC | Age: 77
End: 2022-05-09

## 2022-12-20 LAB
ANION GAP SERPL CALC-SCNC: 16 MMOL/L
BUN SERPL-MCNC: 37 MG/DL
CALCIUM SERPL-MCNC: 11.5 MG/DL
CALCIUM SERPL-MCNC: 11.5 MG/DL
CHLORIDE SERPL-SCNC: 101 MMOL/L
CO2 SERPL-SCNC: 27 MMOL/L
CREAT SERPL-MCNC: 2.47 MG/DL
CYSTATIN C SERPL-MCNC: 3.34 MG/L
EGFR: 19 ML/MIN/1.73M2
GFR/BSA.PRED SERPLBLD CYS-BASED-ARV: 14 ML/MIN/1.73M2
GLUCOSE SERPL-MCNC: 115 MG/DL
HCT VFR BLD CALC: 37.1 %
HGB BLD-MCNC: 11.4 G/DL
PARATHYROID HORMONE INTACT: 192 PG/ML
PHOSPHATE SERPL-MCNC: 3 MG/DL
POTASSIUM SERPL-SCNC: 4.3 MMOL/L
POTASSIUM SERPL-SCNC: 4.6 MMOL/L
SODIUM SERPL-SCNC: 143 MMOL/L
URATE SERPL-MCNC: 8.4 MG/DL

## 2022-12-21 LAB
ESTIMATED AVERAGE GLUCOSE: 131 MG/DL
HBA1C MFR BLD HPLC: 6.2 %

## 2022-12-29 RX ORDER — SODIUM BICARBONATE 650 MG/1
650 TABLET ORAL TWICE DAILY
Qty: 180 | Refills: 3 | Status: ACTIVE | COMMUNITY
Start: 2020-07-09 | End: 1900-01-01

## 2023-01-11 ENCOUNTER — APPOINTMENT (OUTPATIENT)
Dept: NEPHROLOGY | Facility: CLINIC | Age: 78
End: 2023-01-11
Payer: MEDICARE

## 2023-01-11 VITALS
WEIGHT: 197 LBS | HEART RATE: 88 BPM | BODY MASS INDEX: 29.18 KG/M2 | SYSTOLIC BLOOD PRESSURE: 138 MMHG | DIASTOLIC BLOOD PRESSURE: 76 MMHG | HEIGHT: 69 IN

## 2023-01-11 DIAGNOSIS — N18.4 CHRONIC KIDNEY DISEASE, STAGE 4 (SEVERE): ICD-10-CM

## 2023-01-11 PROCEDURE — 99214 OFFICE O/P EST MOD 30 MIN: CPT

## 2023-01-11 RX ORDER — METFORMIN HYDROCHLORIDE 500 MG/1
500 TABLET, COATED ORAL
Qty: 120 | Refills: 3 | Status: ACTIVE | COMMUNITY
Start: 2020-10-29 | End: 1900-01-01

## 2023-01-11 RX ORDER — FUROSEMIDE 40 MG/1
40 TABLET ORAL DAILY
Qty: 90 | Refills: 1 | Status: ACTIVE | COMMUNITY

## 2023-01-11 RX ORDER — CINACALCET 30 MG/1
30 TABLET ORAL DAILY
Qty: 30 | Refills: 3 | Status: ACTIVE | COMMUNITY
Start: 2022-03-03 | End: 1900-01-01

## 2023-01-11 RX ORDER — SODIUM BICARBONATE 650 MG/1
650 TABLET ORAL TWICE DAILY
Qty: 120 | Refills: 4 | Status: ACTIVE | COMMUNITY
Start: 2021-10-11 | End: 1900-01-01

## 2023-01-11 RX ORDER — LOSARTAN POTASSIUM 100 MG/1
100 TABLET, FILM COATED ORAL
Qty: 90 | Refills: 1 | Status: ACTIVE | COMMUNITY
Start: 2020-10-14

## 2023-01-11 NOTE — CONSULT LETTER
[Dear  ___] : Dear  [unfilled], [Consult Letter:] : I had the pleasure of evaluating your patient, [unfilled]. [FreeTextEntry2] : Dr Fausto Mills [FreeTextEntry1] : Happy new year, Tevin.  Best regards, Syed Quigley

## 2023-01-11 NOTE — HISTORY OF PRESENT ILLNESS
[FreeTextEntry1] : 77-year-old woman with a long history of hypertension and type 2 diabetes, whose renal function worsened 2 years ago, with creatinine rising to as high as 3.85.  However she recovered from that bout of CANDICE of uncertain etiology, and creatinine has stabilized in the 2.4-2.5 range.  Currently, creatinine is 2.47 with a GFR of 19 by creatinine, but only 14 by Cystatin C.  K is 4.6 with a CO2 of 27.  Blood sugar 115.  Calcium is 11.5 with PTH having fallen from 3 82-1 92.  I have ordered Cinacalcet but its not clear to me that it was covered or that she is taking it.  I am reordering.  Her appetite is fair, with no nausea or vomiting.  She does have pruritus and her skin is quite dry.  Her BP on 2 visits last March to neurology and neurosurgery was 141/83 and 141/88.  She remains on losartan 100 mg daily and furosemide.

## 2023-01-11 NOTE — ASSESSMENT
[FreeTextEntry1] : 77-year-old woman with CKD 4-5, anemia of CKD, hypercalcemia -hyperkalemia and metabolic acidosis are currently well controlled.  Her BP has improved somewhat.  I am reordering Cinacalcet in the hope that it will be covered.  I have asked her to use skin moisturizers regularly.  I ordered labs to be done in 4 months to include BMP, Cystatin C, PTH, uric acid, H&H.

## 2023-04-18 NOTE — END OF VISIT
[Time Spent: ___ minutes] : I have spent [unfilled] minutes of time on the encounter.
[FreeTextEntry2] : 19 month old sister

## 2023-05-02 LAB
CYSTATIN C SERPL-MCNC: 2.9 MG/L
GFR/BSA.PRED SERPLBLD CYS-BASED-ARV: 16 ML/MIN/1.73M2

## 2023-05-03 LAB
ANION GAP SERPL CALC-SCNC: 17 MMOL/L
BUN SERPL-MCNC: 33 MG/DL
CALCIUM SERPL-MCNC: 10.9 MG/DL
CALCIUM SERPL-MCNC: 10.9 MG/DL
CHLORIDE SERPL-SCNC: 100 MMOL/L
CO2 SERPL-SCNC: 26 MMOL/L
CREAT SERPL-MCNC: 2.36 MG/DL
EGFR: 20 ML/MIN/1.73M2
ESTIMATED AVERAGE GLUCOSE: 120 MG/DL
GLUCOSE SERPL-MCNC: 154 MG/DL
HBA1C MFR BLD HPLC: 5.8 %
HCT VFR BLD CALC: 37.4 %
HGB BLD-MCNC: 11.4 G/DL
IRON SATN MFR SERPL: 24 %
IRON SERPL-MCNC: 66 UG/DL
PARATHYROID HORMONE INTACT: 196 PG/ML
POTASSIUM SERPL-SCNC: 4.8 MMOL/L
SODIUM SERPL-SCNC: 143 MMOL/L
TIBC SERPL-MCNC: 268 UG/DL
UIBC SERPL-MCNC: 203 UG/DL
URATE SERPL-MCNC: 8.1 MG/DL

## 2023-05-10 ENCOUNTER — APPOINTMENT (OUTPATIENT)
Dept: NEPHROLOGY | Facility: CLINIC | Age: 78
End: 2023-05-10
Payer: MEDICARE

## 2023-05-10 VITALS
BODY MASS INDEX: 29.03 KG/M2 | HEIGHT: 69 IN | DIASTOLIC BLOOD PRESSURE: 74 MMHG | WEIGHT: 196 LBS | SYSTOLIC BLOOD PRESSURE: 112 MMHG | HEART RATE: 84 BPM

## 2023-05-10 DIAGNOSIS — E66.9 TYPE 2 DIABETES MELLITUS WITH OTHER SPECIFIED COMPLICATION: ICD-10-CM

## 2023-05-10 DIAGNOSIS — N18.9 CHRONIC KIDNEY DISEASE, UNSPECIFIED: ICD-10-CM

## 2023-05-10 DIAGNOSIS — E11.69 TYPE 2 DIABETES MELLITUS WITH OTHER SPECIFIED COMPLICATION: ICD-10-CM

## 2023-05-10 DIAGNOSIS — E87.20 ACIDOSIS, UNSPECIFIED: ICD-10-CM

## 2023-05-10 DIAGNOSIS — Z86.39 PERSONAL HISTORY OF OTHER ENDOCRINE, NUTRITIONAL AND METABOLIC DISEASE: ICD-10-CM

## 2023-05-10 DIAGNOSIS — D63.1 CHRONIC KIDNEY DISEASE, UNSPECIFIED: ICD-10-CM

## 2023-05-10 DIAGNOSIS — I10 ESSENTIAL (PRIMARY) HYPERTENSION: ICD-10-CM

## 2023-05-10 DIAGNOSIS — M10.9 GOUT, UNSPECIFIED: ICD-10-CM

## 2023-05-10 DIAGNOSIS — N25.81 SECONDARY HYPERPARATHYROIDISM OF RENAL ORIGIN: ICD-10-CM

## 2023-05-10 PROCEDURE — 99214 OFFICE O/P EST MOD 30 MIN: CPT

## 2023-05-10 NOTE — ASSESSMENT
[FreeTextEntry1] : 78-year-old woman with stable CKD stage IV, excellent BP control.  Her pruritus is that her main complaint and I have asked her to use aggressive skin moisturizers and I will try again to get Sensipar/Cinacalcet for her.  We cannot use renal vitamin D such as calcitriol because she is already hypercalcemic.  We have not yet broached the issue of fistula creation and probably will not until her renal function worsens a bit more.  I have ordered labs to be done in 4 months, to include H&H, BMP, Cystatin C, phosphorus, PTH.

## 2023-05-10 NOTE — HISTORY OF PRESENT ILLNESS
[FreeTextEntry1] : 78-year-old woman with a long history of hypertension and type 2 diabetes, whose renal function worsened 2 years ago with creatinine rising as high as 3.85.  She recovered from that incident of CANDICE and creatinine has stabilized, currently at 2.36, with a BUN of 33, GFR of 20.  Her K is 4.8 with a CO2 of 26.  She is hypercalcemic, with a calcium of 10.9, , and severe pruritus.  I previously tried prescribing Sensipar and insurance did not cover it.  I will try again.  Her appetite is good and she has no flagrant uremic symptoms.  I have advised her to use skin moisturizers more aggressively, morning and evening.  Her hemoglobin is 11.4.  Her GFR is actually 16 as measured by Cystatin C so renal function may be slightly worse than it appears.

## 2023-10-11 ENCOUNTER — APPOINTMENT (OUTPATIENT)
Dept: NEPHROLOGY | Facility: CLINIC | Age: 78
End: 2023-10-11